# Patient Record
Sex: MALE | Race: WHITE
[De-identification: names, ages, dates, MRNs, and addresses within clinical notes are randomized per-mention and may not be internally consistent; named-entity substitution may affect disease eponyms.]

---

## 2018-05-19 ENCOUNTER — HOSPITAL ENCOUNTER (EMERGENCY)
Dept: HOSPITAL 25 - UCCORT | Age: 53
Discharge: HOME | End: 2018-05-19
Payer: COMMERCIAL

## 2018-05-19 VITALS — SYSTOLIC BLOOD PRESSURE: 124 MMHG | DIASTOLIC BLOOD PRESSURE: 75 MMHG

## 2018-05-19 DIAGNOSIS — F17.290: ICD-10-CM

## 2018-05-19 DIAGNOSIS — H04.322: Primary | ICD-10-CM

## 2018-05-19 DIAGNOSIS — I10: ICD-10-CM

## 2018-05-19 PROCEDURE — 99213 OFFICE O/P EST LOW 20 MIN: CPT

## 2018-05-19 PROCEDURE — G0463 HOSPITAL OUTPT CLINIC VISIT: HCPCS

## 2018-05-19 NOTE — UC
Eye Complaint HPI





- HPI Summary


HPI Summary: 





51 y/o male presents to the urgent care c/o left eye watery eye for the past 2 

weeks.  Pt states he thought it was allergies, but symptoms worsen when he 

started to feel pain along the medial aspect of the nose and mild swelling for 

the past 3 days.  Pain is 6/10 at touch.  He took and ASA this morning to 

alleviate symptoms.  He doesn't like to take medications.   he is a farmer.  Pt 

denies fever, yellowish eye discharge, photophobia, HA, eye pain, visual 

disturbance, dizziness, SOB, chest pain, abdominal pain, N/V/D








- History of Current Complaint


Chief Complaint: UCEye


Stated Complaint: LEFT EYE COMPLAINT


Time Seen by Provider: 05/19/18 20:36


Hx Obtained From: Patient


Onset/Duration: Gradual Onset, Lasting Weeks - 2 weeks, Still Present, Worse 

Since - 3 days


Timing: Constant


Severity Initially: Mild


Severity Currently: Moderate


Pain Intensity: 6


Pain Scale Used: 0-10 Numeric


Location of Injury: Other - left lacrimal duct


Character: Dull


Aggravating Factor(s): Other - touch


Alleviating Factor(s): Nothing


Associated Signs And Symptoms: Positive: Drainage (Clear), Swelling - left 

lacrimal duct.  Negative: Fever





- Risk Factors


Penetrating Injury Risk Factor: Negative


Acute Glaucoma Risk Factors: Negative


Optic Artery Occlusion Risk Factors: Negative





- Allergies/Home Medications


Allergies/Adverse Reactions: 


 Allergies











Allergy/AdvReac Type Severity Reaction Status Date / Time


 


No Known Allergies Allergy   Verified 05/19/18 20:08











Home Medications: 


 Home Medications





Aspirin TAB* [Aspirin 325 MG TAB*] 975 mg PO DAILY PRN 05/19/18 [History 

Confirmed 05/19/18]


Lisinopril/Hydrochlorothiazide [Zestoretic 20-25 mg-] 1 tab PO DAILY 05/19/18 [

History Confirmed 05/19/18]











PMH/Surg Hx/FS Hx/Imm Hx


Previously Healthy: Yes


Cardiovascular History: Hypertension





- Surgical History


Surgical History: None





- Family History


Known Family History: Positive: Cardiac Disease, Respiratory Disease





- Social History


Occupation: Employed Full-time


Lives: With Family


Alcohol Use: None


Substance Use Type: None


Smoking Status (MU): Never Smoked Tobacco


Type: Smokeless Tobacco


Length of Time of Smoking/Using Tobacco: 20





Review of Systems


Constitutional: Negative


Skin: Negative


Eyes: Drainage - left eye w/ clear drainage and pain in the left lacrimal duct


ENT: Negative


Respiratory: Negative


Cardiovascular: Negative


Gastrointestinal: Negative


Genitourinary: Negative


Motor: Negative


Neurovascular: Negative


Musculoskeletal: Negative


Neurological: Negative


Psychological: Negative


Is Patient Immunocompromised?: No


All Other Systems Reviewed And Are Negative: Yes





Physical Exam





- Summary


Physical Exam Summary: 





Vital Signs Reviewed: Yes


General: Well appearing, well  nourished male in no apparent pain distress


Eyes: Positive: B/L Conjunctiva clear- Visual acuity: WNL,Visual fields: full 

to confrontation.no periorbital  soft tissue swelling.  PERRLA,  EOMI intact w/

out limitation or complaint of pain.  eyelashes clear. mild tearing and clear 

drainage observed.  No ciliary flush.  No chemosis, No photophobia.  Tenderness 

to palaption over the medial aspect of the eye around the left lacrimal duct w/ 

mild swelling. Normal fundoscopic exam; no proptosis, exophthalmos, nystagmus.


ENT: Positive: Normal ENT inspection, Hearing grossly normal, Pharynx normal, 

Nasal congestion, Nasal drainage - clear, TMs normal - B/L external ear canal 

clear , TM's WNL.  Negative: Tonsillar swelling, Tonsillar exudate


Neck: Positive: Supple, Nontender, No Lymphadenopathy


Respiratory: Positive: Chest nontender, Lungs clear, Normal breath sounds, No 

respiratory distress


Cardiovascular: Positive: RRR, No Murmur, Pulses Normal, Brisk Capillary Refill


Abdomen Description: Positive: Nontender, No Organomegaly, Soft.  Negative: CVA 

Tenderness (R), CVA Tenderness (L)


Bowel Sounds: Positive: Present


Musculoskeletal: Positive: Strength Intact, ROM Intact, No Edema


Neurological Exam: Normal


Psychological Exam: Normal


Skin Exam: Normal








Triage Information Reviewed: Yes


Vital Signs: 


 Initial Vital Signs











Temp  98.7 F   05/19/18 20:11


 


Pulse  74   05/19/18 20:11


 


Resp  20   05/19/18 20:11


 


BP  124/75   05/19/18 20:11


 


Pulse Ox  97   05/19/18 20:11














Eye Complaint Course/Dx





- Course


Course Of Treatment: 51 y/o male presents to the urgent care c/o left eye 

watery eye for the past 2 weeks.  Pt states he thought it was allergies, but 

symptoms worsen when he started to feel pain along the medial aspect of the 

nose and mild swelling for the past 3 days.  Pain is 6/10 at touch.  He took 

and ASA this morning to alleviate symptoms.  He doesn't like to take 

medications.   He is a farmer.  Pt denies fever, yellowish eye discharge, 

photophobia, HA, eye pain, visual disturbance, dizziness, SOB, chest pain, 

abdominal pain, N/V/D. hx obtained.    Pt w/ a mildly swollen and tender left 

lacrimal duct on examination.  Most likely Acute Dacryocystitis. Pt's symptoms 

discussed w/ Dr Ho and he recommended Erythromycin Ophthalmic oint.  Pt.  

Given Oint at the clinic since Pharmacy closed. Pt Rx Erythromycin ointment and 

Tylenol PO. Pt advised to apply cold compresses and massage lacrimal duct 

throughout the day.  If not improvement of symptoms to f/u w/ Ophthalmologist 

Dr Rosario for further evaluation and tx.  Pt understood and agreed w/ plan 

of care.





- Differential Dx/Diagnosis


Differential Diagnosis/HQI/PQRI: Conjunctivitis, Corneal Abrasion, Keratitis, 

Periorbital Cellulitis, Uveitis


Provider Diagnoses: 1- Left eye dracrocystitis





Discharge





- Sign-Out/Discharge


Documenting (check all that apply): Discharge/Admit/Transfer - D/C home





- Discharge Plan


Condition: Stable


Disposition: HOME


Prescriptions: 


Acetaminophen TAB* [Tylenol TAB*] 650 mg PO Q6H PRN #30 tab


 PRN Reason: Pain


Erythromycin TOPICAL GEL* [Erythromycin OPTH OINT*] 1 applic TOPICAL TID #1 oint


Patient Education Materials:  Blocked Tear Duct (ED)


Referrals: 


Mehran Parrish MD [Primary Care Provider] - 2 Days


iLssette Rosario MD [Medical Doctor] - 2 Days


Additional Instructions: 


1-Please apply ophthalmic ointment in your LF eye as directed. Please apply 

cold compresses and massage the eye with gentle pressure 4-5 times for 10-15min 

throughout the day 


2- Take Tylenol PO as directed to alleviate symptoms of pain and swelling


3- If you do not improve or if symptoms worsen please f/u with ophthalmologist 

Dr Rosario for further evaluation and treatment











- Billing Disposition and Condition


Condition: STABLE


Disposition: HOME